# Patient Record
Sex: FEMALE | Race: BLACK OR AFRICAN AMERICAN | NOT HISPANIC OR LATINO | URBAN - METROPOLITAN AREA
[De-identification: names, ages, dates, MRNs, and addresses within clinical notes are randomized per-mention and may not be internally consistent; named-entity substitution may affect disease eponyms.]

---

## 2023-05-17 ENCOUNTER — OFFICE (OUTPATIENT)
Dept: URBAN - METROPOLITAN AREA CLINIC 19 | Facility: CLINIC | Age: 53
End: 2023-05-17

## 2023-05-17 VITALS
OXYGEN SATURATION: 96 % | HEART RATE: 85 BPM | WEIGHT: 293 LBS | DIASTOLIC BLOOD PRESSURE: 97 MMHG | SYSTOLIC BLOOD PRESSURE: 169 MMHG | HEIGHT: 63 IN

## 2023-05-17 DIAGNOSIS — K21.9 GASTRO-ESOPHAGEAL REFLUX DISEASE WITHOUT ESOPHAGITIS: ICD-10-CM

## 2023-05-17 DIAGNOSIS — K59.00 CONSTIPATION, UNSPECIFIED: ICD-10-CM

## 2023-05-17 DIAGNOSIS — R11.10 VOMITING, UNSPECIFIED: ICD-10-CM

## 2023-05-17 DIAGNOSIS — R13.10 DYSPHAGIA, UNSPECIFIED: ICD-10-CM

## 2023-05-17 DIAGNOSIS — R19.5 OTHER FECAL ABNORMALITIES: ICD-10-CM

## 2023-05-17 PROCEDURE — 99204 OFFICE O/P NEW MOD 45 MIN: CPT

## 2023-05-17 RX ORDER — PANTOPRAZOLE 40 MG/1
80 TABLET, DELAYED RELEASE ORAL
Qty: 60 | Refills: 5 | Status: ACTIVE

## 2023-05-17 RX ORDER — SODIUM PICOSULFATE, MAGNESIUM OXIDE, AND ANHYDROUS CITRIC ACID 10; 3.5; 12 MG/160ML; G/160ML; G/160ML
LIQUID ORAL
Qty: 320 | Refills: 0 | Status: ACTIVE
Start: 2023-05-17

## 2023-05-17 NOTE — SERVICENOTES
The patient's assessment was reviewed with Dr. Solomon and a collaborative plan of care was established.

## 2023-05-17 NOTE — SERVICEHPINOTES
52-year-old morbidly obese black female referred by her PCP after a recent positive Cologuard test as well as symptoms of dysphagia, chronic GERD and changes in bowel habits.  She has had chronic GERD that does not seem to be well managed on pantoprazole 40 mg daily.  She has also recently been experiencing progressive dysphagia with solid foods.  She has changes in her bowel habits that sound like constipation.  She will go several days without a bowel movement.  She denies taking any laxatives for this as she does not like to take medication.  She takes ibuprofen 800 mg twice daily for arthritic pain.  She denies any overt GI bleeding.  She denies any previous GI tests or studies, including previous colonoscopies or Cologuard test, prior to this most recent Cologuard test.  Her PCP discussed starting a weight loss medication (i.e. Wegovy) , but the patient is concerned about the potential side effects from these kinds of medications.  She is trying to work on diet strategies for weight loss.  Family history is negative for colon neoplasm.  You can access the [facility], offered by [facility], by registering with the following website: [email or web address]